# Patient Record
Sex: FEMALE | Race: OTHER | URBAN - METROPOLITAN AREA
[De-identification: names, ages, dates, MRNs, and addresses within clinical notes are randomized per-mention and may not be internally consistent; named-entity substitution may affect disease eponyms.]

---

## 2023-12-01 ENCOUNTER — TELEPHONE (OUTPATIENT)
Dept: OPHTHALMOLOGY | Facility: CLINIC | Age: 48
End: 2023-12-01
Payer: COMMERCIAL

## 2023-12-01 ENCOUNTER — HOSPITAL ENCOUNTER (EMERGENCY)
Facility: HOSPITAL | Age: 48
Discharge: HOME | End: 2023-12-01
Payer: COMMERCIAL

## 2023-12-01 VITALS
RESPIRATION RATE: 16 BRPM | HEART RATE: 83 BPM | HEIGHT: 66 IN | TEMPERATURE: 98 F | SYSTOLIC BLOOD PRESSURE: 117 MMHG | DIASTOLIC BLOOD PRESSURE: 69 MMHG | BODY MASS INDEX: 26.03 KG/M2 | WEIGHT: 162 LBS | OXYGEN SATURATION: 99 %

## 2023-12-01 DIAGNOSIS — H04.123 DRY EYES, BILATERAL: Primary | ICD-10-CM

## 2023-12-01 PROCEDURE — 99284 EMERGENCY DEPT VISIT MOD MDM: CPT

## 2023-12-01 PROCEDURE — 99283 EMERGENCY DEPT VISIT LOW MDM: CPT

## 2023-12-01 PROCEDURE — 99285 EMERGENCY DEPT VISIT HI MDM: CPT | Performed by: NURSE PRACTITIONER

## 2023-12-01 RX ORDER — ERYTHROMYCIN 5 MG/G
1 OINTMENT OPHTHALMIC ONCE
Status: DISCONTINUED | OUTPATIENT
Start: 2023-12-01 | End: 2023-12-01 | Stop reason: HOSPADM

## 2023-12-01 RX ORDER — ERYTHROMYCIN 5 MG/G
OINTMENT OPHTHALMIC NIGHTLY
Qty: 3.5 G | Refills: 0 | Status: SHIPPED | OUTPATIENT
Start: 2023-12-01 | End: 2023-12-11

## 2023-12-01 ASSESSMENT — COLUMBIA-SUICIDE SEVERITY RATING SCALE - C-SSRS
6. HAVE YOU EVER DONE ANYTHING, STARTED TO DO ANYTHING, OR PREPARED TO DO ANYTHING TO END YOUR LIFE?: NO
1. IN THE PAST MONTH, HAVE YOU WISHED YOU WERE DEAD OR WISHED YOU COULD GO TO SLEEP AND NOT WAKE UP?: NO
2. HAVE YOU ACTUALLY HAD ANY THOUGHTS OF KILLING YOURSELF?: NO

## 2023-12-01 NOTE — Clinical Note
Di Dixon was seen and treated in our emergency department on 12/1/2023.  She may return to work on 12/03/2023.       If you have any questions or concerns, please don't hesitate to call.      Iza Rojas PA-C

## 2023-12-01 NOTE — TELEPHONE ENCOUNTER
Pt called triage regarding increase in flashes/floaters. Blurry visual acuity (VA). No triage clinic until 12/11. Recommended pt go to ER to be seen by on call.

## 2023-12-01 NOTE — ED TRIAGE NOTES
Flashes and floaters in the right eye constant x3 weeks. States she called ophthalmology office and they told her to report to the ED. Denies headache. Denies pmhx.

## 2023-12-01 NOTE — ED PROVIDER NOTES
Emergency Department Encounter  [facility] EMERGENCY MEDICINE    Patient: Di Dixon  MRN: [id number]  : 1975  Date of Evaluation: 2023  ED Provider: ELIZABET Rincon      Chief Complaint       Chief Complaint   Patient presents with    Eye Problem     VISION CHANGE        Limitations to History: none  Historian: patient  Records reviewed: EMR inpatient and outpatient notes, Care Everywhere    This is a 48-year-old female who presents to the emergency room with left visual changes.  Patient states that she developed flashes and floaters in her left eye approximately 3 weeks ago.  Denies any recent injury or trauma.  Patient states that she wears reading glasses.  Patient states that she has been having an intermittent frontal headache.  Patient states that she called the on-call ophthalmologist who recommended coming to the emergency room for further evaluation.    PMH: Denies  PSH: Denies  Allergies: Denies  Social HX: + Smoker, denies alcohol or drug use.  Family HX: No family history pertinent to current presenting problem  Medications: Denies    ROS:     Review of Systems  14 systems reviewed and otherwise acutely negative except as in the [place].        Past History     Past Medical History:   Diagnosis Date    Pain in unspecified knee     Knee pain     Past Surgical History:   Procedure Laterality Date    OTHER SURGICAL HISTORY  2020    Cyst excision    OTHER SURGICAL HISTORY  2020    Knee surgery         Medications/Allergies     Previous Medications    No medications on file     No Known Allergies     Physical Exam       ED Triage Vitals [23 1724]   Temp Heart Rate Resp BP   36.7 °C (98 °F) 83 16 117/69      SpO2 Temp Source Heart Rate Source Patient Position   99 % Temporal -- --      BP Location FiO2 (%)     -- --       Physical Exam:    Appearance: Alert, oriented , cooperative,  in no acute distress.     Skin: Intact,  dry skin, no lesions, rash,  "petechiae or purpura.     Eyes: PERRLA, EOMs intact.    ENT: Hearing grossly intact.     Neck: Supple, without meningismus.     Pulmonary: Clear bilaterally with good chest wall excursion. No rales, rhonchi or wheezing. No accessory muscle use or stridor.    Cardiac: Normal S1, S2 without murmur, rub, gallop or extrasystole. No JVD, Carotids without bruits.    Abdomen: Soft, nontender, active bowel sounds.  No palpable organomegaly.  No rebound or guarding.      Musculoskeletal: Full range of motion. no pain, edema, or deformity. Pulses full and equal. No cyanosis, clubbing, or edema.    Neurological:  Normal sensation, no weakness, no focal findings identified.    Psychiatric: Appropriate mood and affect.       Diagnostics   Labs:  No results found for this or any previous visit (from the past 24 hour(s)).   Radiographs:  No orders to display           Assessment   In brief, Di Dixon is a 48 y.o. female who presented to the emergency department with left eye visual changes.          ED Course/MDM   Visit Vitals  /69   Pulse 83   Temp 36.7 °C (98 °F) (Temporal)   Resp 16   Ht 1.676 m (5' 6\")   Wt 73.5 kg (162 lb)   SpO2 99%   BMI 26.15 kg/m²   BSA 1.85 m²       Medications - No data to display    Patient remained stable while in the emergency department. Previous outpatient and ED records were reviewed. Outside records were reviewed.  Ophthalmology was called for consult.  Please see ophthalmology consult for complete details.    Final Impression    No diagnosis found.      DISPOSITION  Disposition: Signed out to Iza Rojas PA-C    Comment: Please note this report has been produced using speech recognition software and may contain errors related to that system including errors in grammar, punctuation, and spelling, as well as words and phrases that may be inappropriate.  If there are any questions or concerns please feel free to contact the dictating provider for clarification.    Geri Jain, " MICHELLE-MICHELLE Lazaro-KEMI  12/01/23 1848

## 2023-12-02 NOTE — PROGRESS NOTES
Emergency Medicine Transition of Care Note.    I received Di Dixon in signout from MOY Jain CNP.  Please see the previous ED provider note for all HPI, PE and MDM up to the time of signout at 1900. This is in addition to the primary record.    In brief Di Dixon is an 48 y.o. female presenting for   Chief Complaint   Patient presents with    Eye Problem     VISION CHANGE     At the time of signout we were awaiting: ophthalmology recommendations.  In brief, this is a 47 yo female who was referred to the emergency department by outpatient ophthalmology referral line.  Patient has been experiencing flashes and floaters to the left eye for the past 3 weeks.    Diagnostics     ED Course as of 12/01/23 2136   Fri Dec 01, 2023   1849 Signed out to Betty Rojas PA-C [HH]      ED Course User Index  [HH] ELIZABET Rincon         Diagnoses as of 12/01/23 2136   Dry eyes, bilateral       Medical Decision Making    Ophthalmology reports that the patient has dry eyes, recommends artificial tears to her bilateral eyes 6 times daily, erythromycin ointment to her bilateral eyes once daily at bedtime.  Ophthalmology will arrange follow-up for the patient.    Final diagnoses:   [H04.123] Dry eyes, bilateral         Iza Rojas PA-C
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
## 2023-12-04 PROBLEM — H33.321 RETINAL HOLE, RIGHT: Status: ACTIVE | Noted: 2023-12-04

## 2023-12-05 ENCOUNTER — OFFICE VISIT (OUTPATIENT)
Dept: OPHTHALMOLOGY | Facility: CLINIC | Age: 48
End: 2023-12-05
Payer: COMMERCIAL

## 2023-12-05 DIAGNOSIS — H33.321 RETINAL HOLE, RIGHT: ICD-10-CM

## 2023-12-05 DIAGNOSIS — H33.323 RETINAL HOLE OF BOTH EYES: Primary | ICD-10-CM

## 2023-12-05 DIAGNOSIS — H04.123 DRY EYES, BILATERAL: ICD-10-CM

## 2023-12-05 PROCEDURE — 92201 OPSCPY EXTND RTA DRAW UNI/BI: CPT

## 2023-12-05 PROCEDURE — 99204 OFFICE O/P NEW MOD 45 MIN: CPT

## 2023-12-05 ASSESSMENT — TONOMETRY
IOP_METHOD: TONOPEN
OD_IOP_MMHG: 15
OS_IOP_MMHG: 16

## 2023-12-05 ASSESSMENT — VISUAL ACUITY
OD_SC: 20/25-3
METHOD: SNELLEN - LINEAR
OS_PH_SC: 20/25
OS_SC: 20/40-2

## 2023-12-05 ASSESSMENT — SLIT LAMP EXAM - LIDS
COMMENTS: NORMAL
COMMENTS: NORMAL

## 2023-12-05 ASSESSMENT — EXTERNAL EXAM - LEFT EYE: OS_EXAM: NORMAL

## 2023-12-05 ASSESSMENT — EXTERNAL EXAM - RIGHT EYE: OD_EXAM: NORMAL

## 2023-12-05 NOTE — PROGRESS NOTES
#Atrophic retinal hole, Both eye   #Lattice Degeneration, Left Eye  - 4 retinal holes noted in the right eye and 3 in the left eye with a cuff of subretinal fluid around each  - Lattice degeneration in the left eye    Plan  -Dicussed r/b/a of treatment options.   - Patient wishes to proceed with laser retinopexy around retinal holes next available  - Follow-up in 1 month     #Dry eye syndrome, both eyes  - Start preservative free artificial tears six times a day, both eyes  - Can consider punctal plugs in clinic if persistent symptoms   - follow-up with Dr. Uriostegui next available

## 2024-01-12 ENCOUNTER — OFFICE VISIT (OUTPATIENT)
Dept: OPHTHALMOLOGY | Facility: CLINIC | Age: 49
End: 2024-01-12
Payer: COMMERCIAL

## 2024-01-12 DIAGNOSIS — H33.321 RETINAL HOLE, RIGHT: Primary | ICD-10-CM

## 2024-01-12 PROCEDURE — 99213 OFFICE O/P EST LOW 20 MIN: CPT

## 2024-01-12 ASSESSMENT — TONOMETRY
OD_IOP_MMHG: 23
OS_IOP_MMHG: 22
IOP_METHOD: GOLDMANN APPLANATION

## 2024-01-12 ASSESSMENT — SLIT LAMP EXAM - LIDS
COMMENTS: NORMAL
COMMENTS: NORMAL

## 2024-01-12 ASSESSMENT — ENCOUNTER SYMPTOMS
CARDIOVASCULAR NEGATIVE: 0
HEMATOLOGIC/LYMPHATIC NEGATIVE: 0
PSYCHIATRIC NEGATIVE: 0
GASTROINTESTINAL NEGATIVE: 0
MUSCULOSKELETAL NEGATIVE: 0
NEUROLOGICAL NEGATIVE: 0
CONSTITUTIONAL NEGATIVE: 0
RESPIRATORY NEGATIVE: 0
ALLERGIC/IMMUNOLOGIC NEGATIVE: 0
EYES NEGATIVE: 1
ENDOCRINE NEGATIVE: 0

## 2024-01-12 ASSESSMENT — CONF VISUAL FIELD
OD_INFERIOR_TEMPORAL_RESTRICTION: 0
OS_INFERIOR_NASAL_RESTRICTION: 0
OD_SUPERIOR_NASAL_RESTRICTION: 0
OD_SUPERIOR_TEMPORAL_RESTRICTION: 0
OS_NORMAL: 1
OS_SUPERIOR_TEMPORAL_RESTRICTION: 0
METHOD: COUNTING FINGERS
OD_NORMAL: 1
OD_INFERIOR_NASAL_RESTRICTION: 0
OS_SUPERIOR_NASAL_RESTRICTION: 0
OS_INFERIOR_TEMPORAL_RESTRICTION: 0

## 2024-01-12 ASSESSMENT — VISUAL ACUITY
OD_SC: 20/20-1
OS_SC: 20/30-2
METHOD: SNELLEN - LINEAR

## 2024-01-12 ASSESSMENT — EXTERNAL EXAM - RIGHT EYE: OD_EXAM: NORMAL

## 2024-01-12 ASSESSMENT — EXTERNAL EXAM - LEFT EYE: OS_EXAM: NORMAL

## 2024-01-12 NOTE — PROGRESS NOTES
#Atrophic retinal hole, Both eye   #Lattice Degeneration, Left Eye  - 4 retinal holes noted in the right eye and 3 in the left eye with a cuff of subretinal fluid around each  - Lattice degeneration in the left eye    OCT 01/12/24     - Right eye (OD): Normal foveal contour, RPE, outer and inner retinal layers. No IRF or SRF    - Left eye (OS): Normal foveal contour, RPE  outer and inner retinal layers. No IRF or SRF    Plan  -Dicussed r/b/a of treatment options.   - Patient wishes to proceed with laser retinopexy around retinal holes next available  - Laser retinopexy was not done due to laser malfunction  - will schedule for laser OU next visit  - Follow-up in 1 month     #Dry eye syndrome, both eyes  - Start preservative free artificial tears six times a day, both eyes  - Can consider punctal plugs in clinic if persistent symptoms   - follow-up with Dr. Uriostegui next available

## 2024-01-15 ENCOUNTER — OFFICE VISIT (OUTPATIENT)
Dept: OPHTHALMOLOGY | Facility: CLINIC | Age: 49
End: 2024-01-15
Payer: COMMERCIAL

## 2024-01-15 DIAGNOSIS — H16.223 KERATOCONJUNCTIVITIS SICCA OF BOTH EYES NOT SPECIFIED AS SJOGREN'S: ICD-10-CM

## 2024-01-15 DIAGNOSIS — H04.123 DRY EYES, BILATERAL: Primary | ICD-10-CM

## 2024-01-15 PROCEDURE — 99213 OFFICE O/P EST LOW 20 MIN: CPT | Performed by: OPHTHALMOLOGY

## 2024-01-15 PROCEDURE — 68761 CLOSE TEAR DUCT OPENING: CPT | Mod: RIGHT SIDE | Performed by: OPHTHALMOLOGY

## 2024-01-15 ASSESSMENT — ENCOUNTER SYMPTOMS
ENDOCRINE NEGATIVE: 0
EYES NEGATIVE: 1
NEUROLOGICAL NEGATIVE: 0
HEMATOLOGIC/LYMPHATIC NEGATIVE: 0
GASTROINTESTINAL NEGATIVE: 0
ALLERGIC/IMMUNOLOGIC NEGATIVE: 0
CONSTITUTIONAL NEGATIVE: 0
MUSCULOSKELETAL NEGATIVE: 0
CARDIOVASCULAR NEGATIVE: 0
RESPIRATORY NEGATIVE: 0
PSYCHIATRIC NEGATIVE: 0

## 2024-01-15 ASSESSMENT — EXTERNAL EXAM - RIGHT EYE: OD_EXAM: NORMAL

## 2024-01-15 ASSESSMENT — CONF VISUAL FIELD
OS_NORMAL: 1
OS_INFERIOR_TEMPORAL_RESTRICTION: 0
METHOD: COUNTING FINGERS
OS_SUPERIOR_NASAL_RESTRICTION: 0
OD_NORMAL: 1
OD_INFERIOR_TEMPORAL_RESTRICTION: 0
OD_INFERIOR_NASAL_RESTRICTION: 0
OS_INFERIOR_NASAL_RESTRICTION: 0
OD_SUPERIOR_NASAL_RESTRICTION: 0
OD_SUPERIOR_TEMPORAL_RESTRICTION: 0
OS_SUPERIOR_TEMPORAL_RESTRICTION: 0

## 2024-01-15 ASSESSMENT — SLIT LAMP EXAM - LIDS
COMMENTS: NORMAL
COMMENTS: NORMAL

## 2024-01-15 ASSESSMENT — VISUAL ACUITY
OD_SC: 20/20-1
OS_SC: 20/25-2
METHOD: SNELLEN - LINEAR

## 2024-01-15 ASSESSMENT — EXTERNAL EXAM - LEFT EYE: OS_EXAM: NORMAL

## 2024-01-15 NOTE — PROGRESS NOTES
Assessment/Plan   Diagnoses and all orders for this visit:  Dry eyes, bilateral  Keratoconjunctivitis sicca of both eyes not specified as Sjogren's  Pt with hx of dry eye OU  Tried restasis for 6-12 months with no improvement  Currently on PFATs 8x/day  Describes both eyes are always burning, foreign body (FB) sensation with occasional blurry vision  Has some sort of arthritis but not sure what it is and she is not taking any treatment for it  No mention of arthritis in her PCP notes from [facility]  Discussed plugs as a way to help improve dryness OU   -     Punctal Occlusion by Plug, Silicone - OD - Right Eye  -     Punctal Occlusion by Plug, Silicone - OS - Left Eye  Also, gave her a sample of tyrvaya to use bid    2-3 months sooner prn

## 2024-01-31 ENCOUNTER — TELEPHONE (OUTPATIENT)
Dept: OPHTHALMOLOGY | Facility: CLINIC | Age: 49
End: 2024-01-31
Payer: COMMERCIAL

## 2024-01-31 DIAGNOSIS — H16.223 KERATOCONJUNCTIVITIS SICCA OF BOTH EYES NOT SPECIFIED AS SJOGREN'S: Primary | ICD-10-CM

## 2024-01-31 RX ORDER — VARENICLINE 0.03 MG/.05ML
1 SPRAY NASAL 2 TIMES DAILY
Qty: 4.2 ML | Refills: 3 | Status: SHIPPED | OUTPATIENT
Start: 2024-01-31 | End: 2024-02-14 | Stop reason: SDUPTHER

## 2024-02-06 NOTE — TELEPHONE ENCOUNTER
Please tell the pt I sent the rx to an online pharmacy ([facility]) that takes care of prior auth and getting the med covered at the best possible price for her.

## 2024-02-09 ENCOUNTER — OFFICE VISIT (OUTPATIENT)
Dept: OPHTHALMOLOGY | Facility: CLINIC | Age: 49
End: 2024-02-09
Payer: COMMERCIAL

## 2024-02-09 DIAGNOSIS — H33.321 RETINAL HOLE, RIGHT: Primary | ICD-10-CM

## 2024-02-09 DIAGNOSIS — H33.323 RETINAL HOLE OF BOTH EYES: ICD-10-CM

## 2024-02-09 PROCEDURE — 67145 PROPH RTA DTCHMNT PC: CPT | Mod: RIGHT SIDE

## 2024-02-09 ASSESSMENT — SLIT LAMP EXAM - LIDS
COMMENTS: NORMAL
COMMENTS: NORMAL

## 2024-02-09 ASSESSMENT — ENCOUNTER SYMPTOMS
ENDOCRINE NEGATIVE: 0
PSYCHIATRIC NEGATIVE: 0
RESPIRATORY NEGATIVE: 0
CARDIOVASCULAR NEGATIVE: 0
GASTROINTESTINAL NEGATIVE: 0
NEUROLOGICAL NEGATIVE: 0
HEMATOLOGIC/LYMPHATIC NEGATIVE: 0
CONSTITUTIONAL NEGATIVE: 0
ALLERGIC/IMMUNOLOGIC NEGATIVE: 0
MUSCULOSKELETAL NEGATIVE: 0
EYES NEGATIVE: 1

## 2024-02-09 ASSESSMENT — VISUAL ACUITY
OS_CC: 20/20-
METHOD: SNELLEN - LINEAR
OD_CC: 20/20

## 2024-02-09 ASSESSMENT — EXTERNAL EXAM - RIGHT EYE: OD_EXAM: NORMAL

## 2024-02-09 ASSESSMENT — EXTERNAL EXAM - LEFT EYE: OS_EXAM: NORMAL

## 2024-02-09 NOTE — PROGRESS NOTES
#Atrophic retinal hole, Both eye   #Lattice Degeneration, Left Eye    Retinal holes, lattice, right eye  - 2 small holes with lattice at 12  - SN small hole and lattice  - 1 hole with SRF and small HST at the ora at the temporal retina  - 1 hole with surrounding RPE hyperplasia IT    Retinal holes, lattice, left eye  - 3 holes in the left eye with a cuff of subretinal fluid around each  - Lattice degeneration in the left eye    OCT 02/08/24     - Right eye (OD): Normal foveal contour, RPE, outer and inner retinal layers. No IRF or SRF    - Left eye (OS): Normal foveal contour, RPE  outer and inner retinal layers. No IRF or SRF    Plan  -Dicussed r/b/a of treatment options.   - Patient wishes to proceed with laser retinopexy around retinal holes next available  - Laser retinopexy was done OD  - Will need additional laser OD to the HST at the ora in the temporal retina OD -  - will schedule for laser OS next visit  - Follow-up in 1 month     #Dry eye syndrome, both eyes  - Start preservative free artificial tears six times a day, both eyes  - Can consider punctal plugs in clinic if persistent symptoms   - follow-up with Dr. Uriostegui next available

## 2024-02-14 DIAGNOSIS — H16.223 KERATOCONJUNCTIVITIS SICCA OF BOTH EYES NOT SPECIFIED AS SJOGREN'S: ICD-10-CM

## 2024-02-14 RX ORDER — VARENICLINE 0.03 MG/.05ML
1 SPRAY NASAL 2 TIMES DAILY
Qty: 4.2 ML | Refills: 3 | Status: SHIPPED | OUTPATIENT
Start: 2024-02-14 | End: 2024-02-16 | Stop reason: SDUPTHER

## 2024-02-16 DIAGNOSIS — H16.223 KERATOCONJUNCTIVITIS SICCA OF BOTH EYES NOT SPECIFIED AS SJOGREN'S: ICD-10-CM

## 2024-02-16 RX ORDER — VARENICLINE 0.03 MG/.05ML
1 SPRAY NASAL 2 TIMES DAILY
Qty: 4.2 ML | Refills: 3 | Status: SHIPPED | OUTPATIENT
Start: 2024-02-16 | End: 2024-03-21 | Stop reason: SDUPTHER

## 2024-03-08 ENCOUNTER — OFFICE VISIT (OUTPATIENT)
Dept: OPHTHALMOLOGY | Facility: CLINIC | Age: 49
End: 2024-03-08
Payer: COMMERCIAL

## 2024-03-08 DIAGNOSIS — H33.322 PERIPHERAL RETINAL HOLE, LEFT: ICD-10-CM

## 2024-03-08 DIAGNOSIS — H33.321 RETINAL HOLE, RIGHT: Primary | ICD-10-CM

## 2024-03-08 PROCEDURE — 67145 PROPH RTA DTCHMNT PC: CPT | Mod: LEFT SIDE

## 2024-03-08 ASSESSMENT — SLIT LAMP EXAM - LIDS
COMMENTS: NORMAL
COMMENTS: NORMAL

## 2024-03-08 ASSESSMENT — CONF VISUAL FIELD
OD_INFERIOR_NASAL_RESTRICTION: 0
OS_INFERIOR_NASAL_RESTRICTION: 0
OS_SUPERIOR_TEMPORAL_RESTRICTION: 0
OS_INFERIOR_TEMPORAL_RESTRICTION: 0
OS_SUPERIOR_NASAL_RESTRICTION: 0
OD_INFERIOR_TEMPORAL_RESTRICTION: 0
OD_SUPERIOR_TEMPORAL_RESTRICTION: 0
OD_SUPERIOR_NASAL_RESTRICTION: 0
OD_NORMAL: 1
OS_NORMAL: 1

## 2024-03-08 ASSESSMENT — EXTERNAL EXAM - LEFT EYE: OS_EXAM: NORMAL

## 2024-03-08 ASSESSMENT — VISUAL ACUITY
OD_SC: 20/20
METHOD: SNELLEN - LINEAR
OS_PH_SC: 20/20-2
OS_SC: 20/30

## 2024-03-08 ASSESSMENT — TONOMETRY
OS_IOP_MMHG: 16
OD_IOP_MMHG: 16
IOP_METHOD: GOLDMANN APPLANATION

## 2024-03-08 ASSESSMENT — EXTERNAL EXAM - RIGHT EYE: OD_EXAM: NORMAL

## 2024-03-08 NOTE — PROGRESS NOTES
#Atrophic retinal hole, Both eye   #Lattice Degeneration, Left Eye    Retinal holes, lattice, right eye  - 2 small holes with lattice at 12  - SN small hole and lattice  - 1 hole with SRF and small HST at the ora at the temporal retina  - 1 hole with surrounding RPE hyperplasia IT    S/P Laser retinopexy  Stable  Observe    Retinal holes, lattice, left eye  - 3 holes in the left eye with a cuff of subretinal fluid around each  - Lattice degeneration in the left eye    OCT 03/08/24     - Right eye (OD): Normal foveal contour, RPE, outer and inner retinal layers. No IRF or SRF    - Left eye (OS): Normal foveal contour, RPE  outer and inner retinal layers. No IRF or SRF    Plan  -Dicussed r/b/a of treatment options.   - Patient wishes to proceed with laser retinopexy around retinal holes next available  - Laser retinopexy OS done  - Follow-up in 1 month     #Dry eye syndrome, both eyes  - Start preservative free artificial tears six times a day, both eyes  - Can consider punctal plugs in clinic if persistent symptoms   - follow-up with Dr. Uriostegui next available

## 2024-03-21 ENCOUNTER — OFFICE VISIT (OUTPATIENT)
Dept: OPHTHALMOLOGY | Facility: CLINIC | Age: 49
End: 2024-03-21
Payer: COMMERCIAL

## 2024-03-21 DIAGNOSIS — H04.123 DRY EYES, BILATERAL: ICD-10-CM

## 2024-03-21 DIAGNOSIS — H10.13 ALLERGIC CONJUNCTIVITIS OF BOTH EYES: ICD-10-CM

## 2024-03-21 DIAGNOSIS — H16.223 KERATOCONJUNCTIVITIS SICCA OF BOTH EYES NOT SPECIFIED AS SJOGREN'S: Primary | ICD-10-CM

## 2024-03-21 PROCEDURE — 99213 OFFICE O/P EST LOW 20 MIN: CPT | Performed by: OPHTHALMOLOGY

## 2024-03-21 RX ORDER — VARENICLINE 0.03 MG/.05ML
1 SPRAY NASAL 2 TIMES DAILY
Qty: 4.2 ML | Refills: 3 | Status: SHIPPED | OUTPATIENT
Start: 2024-03-21 | End: 2025-03-21

## 2024-03-21 RX ORDER — OLOPATADINE HYDROCHLORIDE 1 MG/ML
1 SOLUTION/ DROPS OPHTHALMIC 2 TIMES DAILY
Qty: 5 ML | Refills: 2 | Status: SHIPPED | OUTPATIENT
Start: 2024-03-21 | End: 2025-03-21

## 2024-03-21 RX ORDER — METHOCARBAMOL 750 MG/1
750 TABLET, FILM COATED ORAL EVERY 8 HOURS PRN
COMMUNITY
Start: 2023-12-07

## 2024-03-21 RX ORDER — CYCLOBENZAPRINE HCL 10 MG
TABLET ORAL
COMMUNITY
Start: 2024-02-28

## 2024-03-21 ASSESSMENT — EXTERNAL EXAM - RIGHT EYE: OD_EXAM: NORMAL

## 2024-03-21 ASSESSMENT — EXTERNAL EXAM - LEFT EYE: OS_EXAM: NORMAL

## 2024-03-21 ASSESSMENT — CONF VISUAL FIELD
OD_NORMAL: 1
OS_INFERIOR_TEMPORAL_RESTRICTION: 0
OS_NORMAL: 1
METHOD: COUNTING FINGERS
OS_SUPERIOR_NASAL_RESTRICTION: 0
OS_INFERIOR_NASAL_RESTRICTION: 0
OD_INFERIOR_TEMPORAL_RESTRICTION: 0
OD_SUPERIOR_TEMPORAL_RESTRICTION: 0
OD_INFERIOR_NASAL_RESTRICTION: 0
OD_SUPERIOR_NASAL_RESTRICTION: 0
OS_SUPERIOR_TEMPORAL_RESTRICTION: 0

## 2024-03-21 ASSESSMENT — TONOMETRY
IOP_METHOD: TONOPEN
OS_IOP_MMHG: HOLD
OD_IOP_MMHG: HOLD

## 2024-03-21 ASSESSMENT — VISUAL ACUITY
OS_SC: 20/30
OD_SC: 20/25
METHOD: SNELLEN - LINEAR

## 2024-03-21 ASSESSMENT — SLIT LAMP EXAM - LIDS
COMMENTS: NORMAL
COMMENTS: NORMAL

## 2024-03-21 ASSESSMENT — ENCOUNTER SYMPTOMS: EYES NEGATIVE: 1

## 2024-08-10 DIAGNOSIS — H16.223 KERATOCONJUNCTIVITIS SICCA OF BOTH EYES NOT SPECIFIED AS SJOGREN'S: ICD-10-CM

## 2024-08-12 RX ORDER — VARENICLINE 0.03 MG/.05ML
SPRAY NASAL
Qty: 8.4 ML | Refills: 3 | Status: SHIPPED | OUTPATIENT
Start: 2024-08-12

## 2024-08-26 DIAGNOSIS — H10.13 ALLERGIC CONJUNCTIVITIS OF BOTH EYES: ICD-10-CM

## 2024-08-26 RX ORDER — OLOPATADINE HYDROCHLORIDE 1 MG/ML
1 SOLUTION/ DROPS OPHTHALMIC 2 TIMES DAILY
Qty: 5 ML | Refills: 2 | Status: SHIPPED | OUTPATIENT
Start: 2024-08-26 | End: 2024-08-30 | Stop reason: SDUPTHER

## 2024-08-30 DIAGNOSIS — H10.13 ALLERGIC CONJUNCTIVITIS OF BOTH EYES: ICD-10-CM

## 2024-08-30 RX ORDER — OLOPATADINE HYDROCHLORIDE 1 MG/ML
1 SOLUTION/ DROPS OPHTHALMIC 2 TIMES DAILY
Qty: 15 ML | Refills: 3 | Status: SHIPPED | OUTPATIENT
Start: 2024-08-30 | End: 2025-08-30

## 2024-08-30 NOTE — TELEPHONE ENCOUNTER
[facility] pharmacy faxed over request for Olopatadine refill. MONICO bishop/ Dr. Uriostegui on 03/21/2024.

## 2024-09-27 ENCOUNTER — OFFICE VISIT (OUTPATIENT)
Dept: PRIMARY CARE | Facility: CLINIC | Age: 49
End: 2024-09-27
Payer: COMMERCIAL

## 2024-09-27 ENCOUNTER — HOSPITAL ENCOUNTER (OUTPATIENT)
Dept: RADIOLOGY | Facility: CLINIC | Age: 49
Discharge: HOME | End: 2024-09-27
Payer: COMMERCIAL

## 2024-09-27 ENCOUNTER — LAB (OUTPATIENT)
Dept: LAB | Facility: LAB | Age: 49
End: 2024-09-27
Payer: COMMERCIAL

## 2024-09-27 VITALS
HEIGHT: 66 IN | DIASTOLIC BLOOD PRESSURE: 66 MMHG | TEMPERATURE: 97.8 F | BODY MASS INDEX: 26.68 KG/M2 | RESPIRATION RATE: 16 BRPM | HEART RATE: 88 BPM | OXYGEN SATURATION: 99 % | SYSTOLIC BLOOD PRESSURE: 102 MMHG | WEIGHT: 166 LBS

## 2024-09-27 DIAGNOSIS — M54.10 ACUTE BACK PAIN WITH RADICULOPATHY: Primary | ICD-10-CM

## 2024-09-27 DIAGNOSIS — M54.10 ACUTE BACK PAIN WITH RADICULOPATHY: ICD-10-CM

## 2024-09-27 DIAGNOSIS — R10.2 PELVIC PAIN: ICD-10-CM

## 2024-09-27 DIAGNOSIS — M54.32 BILATERAL SCIATICA: ICD-10-CM

## 2024-09-27 DIAGNOSIS — M54.31 BILATERAL SCIATICA: ICD-10-CM

## 2024-09-27 PROBLEM — E78.00 HYPERCHOLESTEROLEMIA: Status: ACTIVE | Noted: 2022-10-14

## 2024-09-27 PROBLEM — R53.83 OTHER FATIGUE: Status: ACTIVE | Noted: 2022-10-14

## 2024-09-27 PROBLEM — N95.1 SYMPTOMATIC MENOPAUSAL OR FEMALE CLIMACTERIC STATES: Status: ACTIVE | Noted: 2021-08-09

## 2024-09-27 PROBLEM — J34.2 NASAL SEPTAL DEVIATION: Status: ACTIVE | Noted: 2024-09-27

## 2024-09-27 PROBLEM — E55.9 VITAMIN D DEFICIENCY: Status: ACTIVE | Noted: 2021-08-01

## 2024-09-27 PROBLEM — M19.90 OSTEOARTHRITIS: Status: ACTIVE | Noted: 2018-02-09

## 2024-09-27 PROBLEM — R43.0 ANOSMIA: Status: ACTIVE | Noted: 2024-09-27

## 2024-09-27 PROBLEM — J32.8 OTHER CHRONIC SINUSITIS: Status: ACTIVE | Noted: 2024-09-27

## 2024-09-27 PROBLEM — R00.2 PALPITATION: Status: ACTIVE | Noted: 2022-10-14

## 2024-09-27 PROBLEM — M35.01 KERATOCONJUNCTIVITIS SICCA OF BOTH EYES: Status: ACTIVE | Noted: 2023-12-01

## 2024-09-27 PROBLEM — J34.89 NASAL SEPTAL PERFORATION: Status: ACTIVE | Noted: 2024-09-27

## 2024-09-27 LAB
APPEARANCE UR: CLEAR
BASOPHILS # BLD AUTO: 0.06 X10*3/UL (ref 0–0.1)
BASOPHILS NFR BLD AUTO: 0.7 %
BILIRUB UR STRIP.AUTO-MCNC: NEGATIVE MG/DL
COLOR UR: NORMAL
EOSINOPHIL # BLD AUTO: 0.13 X10*3/UL (ref 0–0.7)
EOSINOPHIL NFR BLD AUTO: 1.5 %
ERYTHROCYTE [DISTWIDTH] IN BLOOD BY AUTOMATED COUNT: 13.9 % (ref 11.5–14.5)
ERYTHROCYTE [SEDIMENTATION RATE] IN BLOOD BY WESTERGREN METHOD: 15 MM/H (ref 0–20)
GLUCOSE UR STRIP.AUTO-MCNC: NORMAL MG/DL
HCT VFR BLD AUTO: 42.8 % (ref 36–46)
HGB BLD-MCNC: 14.3 G/DL (ref 12–16)
IMM GRANULOCYTES # BLD AUTO: 0.04 X10*3/UL (ref 0–0.7)
IMM GRANULOCYTES NFR BLD AUTO: 0.5 % (ref 0–0.9)
KETONES UR STRIP.AUTO-MCNC: NEGATIVE MG/DL
LEUKOCYTE ESTERASE UR QL STRIP.AUTO: NEGATIVE
LYMPHOCYTES # BLD AUTO: 2.18 X10*3/UL (ref 1.2–4.8)
LYMPHOCYTES NFR BLD AUTO: 24.6 %
MCH RBC QN AUTO: 29.2 PG (ref 26–34)
MCHC RBC AUTO-ENTMCNC: 33.4 G/DL (ref 32–36)
MCV RBC AUTO: 87 FL (ref 80–100)
MONOCYTES # BLD AUTO: 0.61 X10*3/UL (ref 0.1–1)
MONOCYTES NFR BLD AUTO: 6.9 %
NEUTROPHILS # BLD AUTO: 5.85 X10*3/UL (ref 1.2–7.7)
NEUTROPHILS NFR BLD AUTO: 65.8 %
NITRITE UR QL STRIP.AUTO: NEGATIVE
NRBC BLD-RTO: 0 /100 WBCS (ref 0–0)
PH UR STRIP.AUTO: 6.5 [PH]
PLATELET # BLD AUTO: 237 X10*3/UL (ref 150–450)
PREGNANCY TEST URINE, POC: NEGATIVE
PROT UR STRIP.AUTO-MCNC: NEGATIVE MG/DL
RBC # BLD AUTO: 4.9 X10*6/UL (ref 4–5.2)
RBC # UR STRIP.AUTO: NEGATIVE /UL
SP GR UR STRIP.AUTO: 1.01
UROBILINOGEN UR STRIP.AUTO-MCNC: NORMAL MG/DL
WBC # BLD AUTO: 8.9 X10*3/UL (ref 4.4–11.3)

## 2024-09-27 PROCEDURE — 72070 X-RAY EXAM THORAC SPINE 2VWS: CPT

## 2024-09-27 PROCEDURE — 85025 COMPLETE CBC W/AUTO DIFF WBC: CPT

## 2024-09-27 PROCEDURE — 72100 X-RAY EXAM L-S SPINE 2/3 VWS: CPT

## 2024-09-27 PROCEDURE — 81003 URINALYSIS AUTO W/O SCOPE: CPT

## 2024-09-27 PROCEDURE — 87086 URINE CULTURE/COLONY COUNT: CPT

## 2024-09-27 PROCEDURE — 36415 COLL VENOUS BLD VENIPUNCTURE: CPT

## 2024-09-27 PROCEDURE — 85652 RBC SED RATE AUTOMATED: CPT

## 2024-09-27 PROCEDURE — 72040 X-RAY EXAM NECK SPINE 2-3 VW: CPT

## 2024-09-27 RX ORDER — TIZANIDINE 4 MG/1
4 TABLET ORAL EVERY 6 HOURS PRN
Qty: 30 TABLET | Refills: 0 | Status: SHIPPED | OUTPATIENT
Start: 2024-09-27 | End: 2024-10-07

## 2024-09-27 RX ORDER — DICLOFENAC SODIUM 10 MG/G
4 GEL TOPICAL 4 TIMES DAILY
Qty: 100 G | Refills: 1 | Status: SHIPPED | OUTPATIENT
Start: 2024-09-27

## 2024-09-27 RX ORDER — PREDNISONE 50 MG/1
50 TABLET ORAL DAILY
Qty: 7 TABLET | Refills: 0 | Status: SHIPPED | OUTPATIENT
Start: 2024-09-27 | End: 2024-10-04

## 2024-09-27 RX ORDER — LIDOCAINE 50 MG/G
2 PATCH TOPICAL DAILY
Qty: 30 PATCH | Refills: 0 | Status: SHIPPED | OUTPATIENT
Start: 2024-09-27 | End: 2024-10-07

## 2024-09-27 ASSESSMENT — ENCOUNTER SYMPTOMS
LEG PAIN: 1
DYSURIA: 0
NUMBNESS: 0
BOWEL INCONTINENCE: 0
WEAKNESS: 0
HEADACHES: 0
TINGLING: 1
PARESTHESIAS: 1
BACK PAIN: 1
FEVER: 0
PARESIS: 0
PERIANAL NUMBNESS: 0
ABDOMINAL PAIN: 1
WEIGHT LOSS: 0

## 2024-09-27 NOTE — PROGRESS NOTES
Subjective     Di Dixon is a 49 y.o. female who presents for Back Pain.    Di Dixon is a 49-year-old female with past medical history significant for osteoarthritis, chronic back pain who presents for acute back pain that began yesterday.  Symptoms have been present for 2 days and include pain in the low back and right gluteal muscles, wrapping around to the right groin and down both the left and right leg (aching, sharp, shooting, and tingling in character; 9/10 in severity) and stiffness in entire spine. Initial inciting event: Patient does not believe there was an inciting event yesterday. Symptoms are the same at all times of the day.  Alleviating factors identifiable by the patient are bending backwards, standing, and walking. Aggravating factors identifiable by the patient are bending forwards, bending sideways, recumbency, running, and sitting. Treatments initiated by the patient: Patient states that she took one of her children's oxycodones that did not improve any of the pain additionally she has been using a heating pad, Motrin, Tylenol with little to no relief. Previous lower back problems: Has previously had acute muscle spasms in the entirety of her spine with her last flare about 3 months ago. Previous work up: none.  Has not had imaging of her spine as far she is aware and there is none located under her chart.  Previous treatments: She has been given small doses of Oxy, muscle relaxants, anti-inflammatories with some relief.    Patient thinks she is perimenopausal as she has had abnormal periods for the last several months with extended time in between periods.  Her last period ended 4 days ago.  She denies any abnormal vaginal discharge, vaginal pain, or any other pelvic pain.    Back Pain  This is a recurrent problem. The current episode started yesterday. The problem occurs constantly. The problem has been gradually worsening since onset. The pain is present in the gluteal, lumbar spine and  "sacro-iliac. The quality of the pain is described as aching and shooting. The pain radiates to the right thigh. The pain is at a severity of 9/10. The pain is moderate. The pain is The same all the time. The symptoms are aggravated by bending, position, sitting, standing and twisting. Stiffness is present All day. Associated symptoms include abdominal pain (Patient states that when palpated in the abdomen there is pain that she feels on her back), leg pain, paresthesias and tingling. Pertinent negatives include no bladder incontinence, bowel incontinence, chest pain, dysuria, fever, headaches, numbness, paresis, pelvic pain, perianal numbness, weakness or weight loss. Risk factors include sedentary lifestyle, lack of exercise and menopause. She has tried bed rest, analgesics, heat, walking and NSAIDs for the symptoms. The treatment provided no relief.        Review of Systems   Constitutional:  Negative for fever and weight loss.   Cardiovascular:  Negative for chest pain.   Gastrointestinal:  Positive for abdominal pain (Patient states that when palpated in the abdomen there is pain that she feels on her back). Negative for bowel incontinence.   Genitourinary:  Negative for bladder incontinence, dysuria and pelvic pain.   Musculoskeletal:  Positive for back pain.   Neurological:  Positive for tingling and paresthesias. Negative for weakness, numbness and headaches.       Objective     Vitals:    09/27/24 1103   BP: 102/66   BP Location: Left arm   Patient Position: Sitting   BP Cuff Size: Adult   Pulse: 88   Resp: 16   Temp: 36.6 °C (97.8 °F)   SpO2: 99%   Weight: 75.3 kg (166 lb)   Height: 1.676 m (5' 6\")        Current Outpatient Medications   Medication Instructions    diclofenac sodium (VOLTAREN) 4 g, Topical, 4 times daily, Apply to low back.    lidocaine (Lidoderm) 5 % patch 2 patches, transdermal, Daily, Apply to painful area 12 hours per day, remove for 12 hours.    predniSONE (DELTASONE) 50 mg, oral, Daily "    tiZANidine (ZANAFLEX) 4 mg, oral, Every 6 hours PRN    varenicline (Tyrvaya) 0.03 mg/spray USE 1 SPRAY IN EACH NOSTRIL TWICE DAILY, APPROXIMATELY 12 HOURS APART        No Known Allergies     Past Surgical History:   Procedure Laterality Date    OTHER SURGICAL HISTORY  02/12/2020    Cyst excision    OTHER SURGICAL HISTORY  02/12/2020    Knee surgery    OTHER SURGICAL HISTORY Bilateral 01/15/2024    Punctal Plugs, RLL, LLL        Social History     Tobacco Use    Smoking status: Every Day     Types: Cigarettes    Smokeless tobacco: Never        Social History     Substance and Sexual Activity   Alcohol Use None       No family history on file.     Immunization History   Administered Date(s) Administered    Pfizer Purple Cap SARS-CoV-2 04/14/2021, 05/05/2021        Physical Exam  Vitals and nursing note reviewed.   Constitutional:       General: She is in acute distress.      Appearance: Normal appearance. She is normal weight. She is not ill-appearing or diaphoretic.   HENT:      Head: Normocephalic and atraumatic.      Mouth/Throat:      Mouth: Mucous membranes are moist.   Eyes:      Conjunctiva/sclera: Conjunctivae normal.   Cardiovascular:      Rate and Rhythm: Normal rate and regular rhythm.      Pulses: Normal pulses.   Pulmonary:      Effort: Pulmonary effort is normal.      Breath sounds: Normal breath sounds.   Abdominal:      General: Abdomen is flat. Bowel sounds are normal. There is no distension.      Palpations: Abdomen is soft. There is no mass.      Tenderness: There is no right CVA tenderness, left CVA tenderness, guarding or rebound.      Hernia: No hernia is present.   Musculoskeletal:         General: Tenderness present.      Cervical back: Normal, normal range of motion and neck supple.      Thoracic back: Spasms and tenderness present. No bony tenderness. Decreased range of motion.      Lumbar back: Spasms, tenderness and bony tenderness present. Decreased range of motion. Positive right  straight leg raise test and positive left straight leg raise test.      Right lower leg: No edema.      Left lower leg: No edema.   Skin:     General: Skin is warm and dry.      Capillary Refill: Capillary refill takes less than 2 seconds.   Neurological:      General: No focal deficit present.      Mental Status: She is alert and oriented to person, place, and time. Mental status is at baseline.   Psychiatric:         Mood and Affect: Mood normal.         Behavior: Behavior normal.         Thought Content: Thought content normal.         Problem List Items Addressed This Visit    None  Visit Diagnoses       Acute back pain with radiculopathy    -  Primary    Relevant Medications    predniSONE (Deltasone) 50 mg tablet    tiZANidine (Zanaflex) 4 mg tablet    lidocaine (Lidoderm) 5 % patch    diclofenac sodium (Voltaren) 1 % gel    Other Relevant Orders    XR cervical spine 2-3 views    XR thoracic spine 2 views    XR lumbar spine 2-3 views    POCT pregnancy, urine manually resulted (Completed)    CBC and Auto Differential    Sedimentation Rate    Urinalysis with Reflex Microscopic    US pelvis    Urine Culture    Bilateral sciatica        Relevant Medications    tiZANidine (Zanaflex) 4 mg tablet    lidocaine (Lidoderm) 5 % patch    Other Relevant Orders    XR cervical spine 2-3 views    XR thoracic spine 2 views    XR lumbar spine 2-3 views    Pelvic pain        Relevant Orders    US pelvis    Urine Culture            Assessment & Plan  Acute back pain with radiculopathy  Patient is presenting today with 2 days of acute low back pain, right gluteal pain, bilateral sciatica.  Patient is notably uncomfortable in exam room today and does have positive straight leg testing as well as difficulty with flexion, extension, switching positions from lying down, sitting, standing up.  She is tender to palpation in the lower thoracic and upper lumbar spine, right piriformis muscle, bilateral ASIS.  Patient has not had imaging of  her spine previously.  We will order cervical, thoracic, lumbar spine x-rays to evaluate for any vertebral abnormalities that may be causing radiculopathy and pain including spondylosis, spondylolisthesis, fracture, stenosis, bulging of disks, etc.  Additionally will encourage patient to use supportive care with heat, lidocaine patch, topical Voltaren, prednisone burst.  After prednisone burst, patient may start anti-inflammatories with ibuprofen.  ED and return precautions given to patient.  Questions answered and patient expressed understanding.  Bilateral sciatica    Pelvic pain  Patient is tender to palpation bilaterally near her ASIS which she states will make pain radiate to her back bilaterally.  Because she is tender to palpation in these areas, will order abdominal ultrasound of the pelvis, urine pregnancy test, UA and urine culture to rule out possible pelvic inflammatory disease, ectopic pregnancy, UTI, or other gynecological emergency, the suspicion for these is overall low.     The patient was seen and discussed with attending physician, Dr. Mijares.       Lyla Farmer DO  Family Medicine Resident, PGY-2  [facility]  [address] [place] [address] [phone]     This note has been transcribed using Dragon voice recognition system and there is a possibility of unintentional typing misprints.  Any information found to be copied from previous providers is done in the best interest of the patient to provide accurate, quality, and continuity of care.

## 2024-09-29 LAB — BACTERIA UR CULT: NORMAL

## 2024-10-02 ENCOUNTER — HOSPITAL ENCOUNTER (OUTPATIENT)
Dept: RADIOLOGY | Facility: HOSPITAL | Age: 49
Discharge: HOME | End: 2024-10-02
Payer: COMMERCIAL

## 2024-10-02 DIAGNOSIS — M54.10 ACUTE BACK PAIN WITH RADICULOPATHY: ICD-10-CM

## 2024-10-02 DIAGNOSIS — R10.2 PELVIC PAIN: ICD-10-CM

## 2024-10-02 PROCEDURE — 76856 US EXAM PELVIC COMPLETE: CPT

## 2024-10-02 PROCEDURE — 76830 TRANSVAGINAL US NON-OB: CPT | Performed by: RADIOLOGY

## 2024-10-02 PROCEDURE — 76856 US EXAM PELVIC COMPLETE: CPT | Performed by: RADIOLOGY

## 2024-12-16 DIAGNOSIS — H04.123 DRY EYES, BILATERAL: Primary | ICD-10-CM

## 2024-12-16 RX ORDER — POLYETHYLENE GLYCOL 400 AND PROPYLENE GLYCOL 4; 3 MG/ML; MG/ML
1 SOLUTION/ DROPS OPHTHALMIC 4 TIMES DAILY
Qty: 60 EACH | Refills: 6 | Status: SHIPPED | OUTPATIENT
Start: 2024-12-16 | End: 2025-03-16

## 2024-12-31 ENCOUNTER — APPOINTMENT (OUTPATIENT)
Dept: PRIMARY CARE | Facility: CLINIC | Age: 49
End: 2024-12-31
Payer: COMMERCIAL

## 2024-12-31 VITALS
DIASTOLIC BLOOD PRESSURE: 66 MMHG | HEART RATE: 79 BPM | SYSTOLIC BLOOD PRESSURE: 99 MMHG | BODY MASS INDEX: 26.51 KG/M2 | RESPIRATION RATE: 16 BRPM | WEIGHT: 164.25 LBS | OXYGEN SATURATION: 98 % | TEMPERATURE: 97.6 F

## 2024-12-31 DIAGNOSIS — M62.81 MUSCLE WEAKNESS (GENERALIZED): ICD-10-CM

## 2024-12-31 DIAGNOSIS — H16.223 KERATOCONJUNCTIVITIS SICCA OF BOTH EYES: Primary | ICD-10-CM

## 2024-12-31 DIAGNOSIS — R53.83 OTHER FATIGUE: ICD-10-CM

## 2024-12-31 DIAGNOSIS — Q80.9 XERODERMA: ICD-10-CM

## 2024-12-31 PROCEDURE — 99214 OFFICE O/P EST MOD 30 MIN: CPT

## 2024-12-31 NOTE — ASSESSMENT & PLAN NOTE
Patient's history of dry eyes, dry mouth, dry skin, fatigue, generalized pain is suggestive of possible underlying autoimmune condition/systemic disease.  Previous workup shows negative DEREK and negative RF from 2020.  No additional testing has been done.  There is suspicion for systemic process and therefore will evaluate with autoimmune panel as ordered.  Additionally will refer patient to rheumatology for further evaluation and possible management of unknown condition.

## 2025-01-03 NOTE — PROGRESS NOTES
I reviewed with the resident the medical history and the resident’s findings on physical examination.  I discussed with the resident the patient’s diagnosis and concur with the treatment plan as documented in the resident note.     Genaro Bernstein, DO

## 2025-01-09 ENCOUNTER — LAB (OUTPATIENT)
Dept: LAB | Facility: LAB | Age: 50
End: 2025-01-09
Payer: COMMERCIAL

## 2025-01-09 DIAGNOSIS — M62.81 MUSCLE WEAKNESS (GENERALIZED): ICD-10-CM

## 2025-01-09 DIAGNOSIS — H16.223 KERATOCONJUNCTIVITIS SICCA OF BOTH EYES: ICD-10-CM

## 2025-01-09 DIAGNOSIS — Q80.9 XERODERMA: ICD-10-CM

## 2025-01-09 LAB
CRP SERPL-MCNC: 0.14 MG/DL
ERYTHROCYTE [SEDIMENTATION RATE] IN BLOOD BY WESTERGREN METHOD: 9 MM/H (ref 0–20)
URATE SERPL-MCNC: 3.9 MG/DL (ref 2.3–6.7)

## 2025-01-09 PROCEDURE — 85652 RBC SED RATE AUTOMATED: CPT

## 2025-01-09 PROCEDURE — 86140 C-REACTIVE PROTEIN: CPT

## 2025-01-09 PROCEDURE — 86431 RHEUMATOID FACTOR QUANT: CPT

## 2025-01-09 PROCEDURE — 86038 ANTINUCLEAR ANTIBODIES: CPT

## 2025-01-09 PROCEDURE — 84550 ASSAY OF BLOOD/URIC ACID: CPT

## 2025-01-09 PROCEDURE — 82306 VITAMIN D 25 HYDROXY: CPT

## 2025-01-10 LAB
25(OH)D3 SERPL-MCNC: 56 NG/ML (ref 30–100)
ANA SER QL HEP2 SUBST: NEGATIVE
RHEUMATOID FACT SER NEPH-ACNC: <10 IU/ML (ref 0–15)

## 2025-02-10 DIAGNOSIS — H16.223 KERATOCONJUNCTIVITIS SICCA OF BOTH EYES NOT SPECIFIED AS SJOGREN'S: ICD-10-CM

## 2025-02-10 RX ORDER — VARENICLINE 0.03 MG/.05ML
SPRAY NASAL
Qty: 8.4 ML | Refills: 3 | Status: SHIPPED | OUTPATIENT
Start: 2025-02-10

## 2025-02-17 ENCOUNTER — APPOINTMENT (OUTPATIENT)
Facility: CLINIC | Age: 50
End: 2025-02-17
Payer: COMMERCIAL

## 2025-02-17 VITALS
WEIGHT: 169 LBS | HEART RATE: 87 BPM | BODY MASS INDEX: 27.16 KG/M2 | DIASTOLIC BLOOD PRESSURE: 67 MMHG | HEIGHT: 66 IN | TEMPERATURE: 97.7 F | SYSTOLIC BLOOD PRESSURE: 102 MMHG

## 2025-02-17 DIAGNOSIS — M62.81 MUSCLE WEAKNESS (GENERALIZED): ICD-10-CM

## 2025-02-17 DIAGNOSIS — H16.223 KERATOCONJUNCTIVITIS SICCA OF BOTH EYES: ICD-10-CM

## 2025-02-17 DIAGNOSIS — R53.83 OTHER FATIGUE: ICD-10-CM

## 2025-02-17 DIAGNOSIS — M15.9 POLYARTICULAR OSTEOARTHRITIS: Primary | ICD-10-CM

## 2025-02-17 DIAGNOSIS — Q80.9 XERODERMA: ICD-10-CM

## 2025-02-17 PROCEDURE — 3008F BODY MASS INDEX DOCD: CPT | Performed by: STUDENT IN AN ORGANIZED HEALTH CARE EDUCATION/TRAINING PROGRAM

## 2025-02-17 PROCEDURE — 99204 OFFICE O/P NEW MOD 45 MIN: CPT | Performed by: STUDENT IN AN ORGANIZED HEALTH CARE EDUCATION/TRAINING PROGRAM

## 2025-02-17 RX ORDER — FLUTICASONE PROPIONATE 50 MCG
2 SPRAY, SUSPENSION (ML) NASAL DAILY
COMMUNITY
Start: 2025-01-24

## 2025-02-17 RX ORDER — MELOXICAM 15 MG/1
15 TABLET ORAL DAILY
Qty: 30 TABLET | Refills: 2 | Status: SHIPPED | OUTPATIENT
Start: 2025-02-17

## 2025-02-17 RX ORDER — CYCLOBENZAPRINE HCL 5 MG
5 TABLET ORAL NIGHTLY PRN
Qty: 30 TABLET | Refills: 2 | Status: SHIPPED | OUTPATIENT
Start: 2025-02-17

## 2025-02-17 NOTE — PROGRESS NOTES
Rheumatology New Outpatient  Note    Subjective   Di Dixon is a 49 y.o. female presenting today for Joint Pain and Dry skin.    History of Presenting Problem:   Di with PMHx of HLP, Sicca, vitamin D deficiency, is presenting today as a NP for sicca symptoms:    She has pain in her knees, hands, hips and all over her joints worse with movements. She has puffiness in her hands in the morning. She takes OTC NSAIDs occasionally which helps. She has knee OA and was offered replacement surgery which she declined. She failed PT and cortisone injections before without much help.     She has dry eye ,dry mouth, dry skin. She denies hair loss, malar rash, photosensitivity, skin rashes,recurrent mouth sores, sudden vision loss, sudden hearing loss, seizures, inflammatory eye disease, h/o blood clots, cold sensitivity in fingers, vasospastic color changes in fingers when exposed to extreme temperatures and muscle weakness.    She denies family history of autoimmune diseases.       Past Medical History:   Past Medical History:   Diagnosis Date    Keratoconjunctivitis sicca not due to Sjogren's syndrome     Pain in unspecified knee     Knee pain       Allergies: No Known Allergies    Medications:   Current Outpatient Medications:     fluticasone (Flonase) 50 mcg/actuation nasal spray, Administer 2 sprays into each nostril once daily., Disp: , Rfl:     peg 400-propylene glycol, PF, (Systane Hydration, PF,) 0.4-0.3 % dropperette, Administer 1 drop into both eyes 4 times a day., Disp: 60 each, Rfl: 6    Tyrvaya 0.03 mg/spray, USE 1 SPRAY IN EACH NOSTRIL TWICE DAILY, APPROXIMATELY 12 HOURS APART, Disp: 8.4 mL, Rfl: 3    Review of Systems:   Constitutional: Denies fever, chills   Eyes: Denies dry eyes, pain in the eyes   ENT: Denies dry mouth, loss of taste, sores in the mouth  Cardiovascular: Denies chest pain, palpitations   Respiratory: Denies shortness of breath   Gastrointestinal: Denies heartburn   Integumentary: Denies  "photosensitivity, rash or lesions, Raynaud's   Neurological: Denies any numbness or tingling    MSK: As per HPI.     All 10 review of systems have been reviewed and are negative for complaint except as noted in the HPI    Objective   Physical Examination:  There were no vitals filed for this visit.  Growth %ile SmartLinks can only be used for patients less than 20 years old.  Ht Readings from Last 1 Encounters:   25 1.676 m (5' 6\")     Wt Readings from Last 1 Encounters:   24 74.5 kg (164 lb 4 oz)       General - NAD, sitting up in chair, well-groomed, pleasant, AAOx3  Head: Normocephalic, atraumatic  Eyes - PERRLA, EOMI. No conjunctiva injection.   Mouth/ENT - Moist oral and nasal mucosa. No facial rash.   Cardiovascular - Normal S1, S2. Regular rate and rhythm. No murmurs or rubs.  Lungs - Symmetric chest expansion. Clear to auscultation bilaterally.   Skin - No rashes or ulcers. Skin warm and dry. No erythema on bilateral cheeks.  Extremities - No edema, cyanosis ,or clubbing  Neurological - Alert and oriented x 3,  grossly intact. No focal deficit.  Musculoskeletal -  Shoulders: Full ROM, without pain, no swelling, warmth or tenderness.  Elbows: Full ROM, without pain, no swelling, warmth or tenderness.  Wrists: Full ROM, without pain, no swelling, warmth or tenderness.  MCP: No swelling, warmth or tenderness. Metacarpal squeeze negative  PIP: No swelling, warmth or tenderness.  DIP: No swelling, warmth or tenderness.  Hands : 5/5.    Sacroiliac joints: No local tenderness. GREY negative.   Hips: Full ROM.  No malalignment.  Knees:  Full ROM, without pain, no swelling, warmth or point tenderness.   Ankles: Full ROM, without pain, swelling, warmth or tenderness.  Toes:  Metatarsal squeeze negative  Cervical spine: No tenderness or limitation of movement  Lumbar spine: No tenderness or limitation of movement        Laboratory Testin2025: RF-, uric acid normal, CRP/ESR normal, DEREK-, vitamin D " normal        Assessment/Plan   Di Dixon is a 49 y.o. female with PMHx of HLP, Sicca, vitamin D deficiency who is presenting today as a NP for sicca symptoms    ##Sicca symptoms & polyarthralgia but DEREK-, RF-: I will check SSA/SSB (could still be positive in negative DEREK) and CCP    ##Polyarticular OA: start Meloxicam    ##Chronic back pain with normal x-rays:  Start Flexeril PRN    The assessment and plan, risk and benefits were discussed with the patient. All of the patients questions were answered and patient agrees to the plan.      Rodney Rolle MD  Clinical   Department of Rheumatology   [facility]

## 2025-04-01 ENCOUNTER — APPOINTMENT (OUTPATIENT)
Facility: CLINIC | Age: 50
End: 2025-04-01
Payer: COMMERCIAL

## 2025-05-22 DIAGNOSIS — Z12.31 ENCOUNTER FOR SCREENING MAMMOGRAM FOR BREAST CANCER: ICD-10-CM

## 2025-05-26 DIAGNOSIS — H16.223 KERATOCONJUNCTIVITIS SICCA OF BOTH EYES NOT SPECIFIED AS SJOGREN'S: ICD-10-CM

## 2025-05-27 RX ORDER — VARENICLINE 0.03 MG/.05ML
SPRAY NASAL
Qty: 8.4 ML | Refills: 3 | Status: SHIPPED | OUTPATIENT
Start: 2025-05-27

## 2025-06-07 DIAGNOSIS — M15.9 POLYARTICULAR OSTEOARTHRITIS: ICD-10-CM

## 2025-06-11 RX ORDER — MELOXICAM 15 MG/1
15 TABLET ORAL DAILY
Qty: 30 TABLET | Refills: 2 | OUTPATIENT
Start: 2025-06-11

## 2025-08-12 ENCOUNTER — APPOINTMENT (OUTPATIENT)
Dept: PRIMARY CARE | Facility: CLINIC | Age: 50
End: 2025-08-12
Payer: COMMERCIAL

## 2025-08-12 VITALS
WEIGHT: 179 LBS | TEMPERATURE: 97.9 F | SYSTOLIC BLOOD PRESSURE: 106 MMHG | BODY MASS INDEX: 28.89 KG/M2 | DIASTOLIC BLOOD PRESSURE: 67 MMHG | HEART RATE: 79 BPM | OXYGEN SATURATION: 96 % | RESPIRATION RATE: 16 BRPM

## 2025-08-12 DIAGNOSIS — M25.512 ACUTE PAIN OF LEFT SHOULDER: Primary | ICD-10-CM

## 2025-08-12 DIAGNOSIS — M13.0 POLYARTICULAR ARTHRITIS: ICD-10-CM

## 2025-08-12 DIAGNOSIS — M67.922 TENDINOPATHY OF LEFT BICEPS TENDON: ICD-10-CM

## 2025-08-12 DIAGNOSIS — H16.223 KERATOCONJUNCTIVITIS SICCA OF BOTH EYES: ICD-10-CM

## 2025-08-12 PROCEDURE — 99214 OFFICE O/P EST MOD 30 MIN: CPT

## 2025-08-12 RX ORDER — METHOCARBAMOL 500 MG/1
500 TABLET, FILM COATED ORAL EVERY 8 HOURS PRN
COMMUNITY
Start: 2025-08-08 | End: 2025-08-12

## 2025-08-12 RX ORDER — TIZANIDINE 4 MG/1
4 TABLET ORAL EVERY 6 HOURS PRN
Qty: 30 TABLET | Refills: 0 | Status: SHIPPED | OUTPATIENT
Start: 2025-08-12 | End: 2025-08-22

## 2025-08-12 RX ORDER — DICLOFENAC SODIUM 75 MG/1
75 TABLET, DELAYED RELEASE ORAL 2 TIMES DAILY PRN
Qty: 180 TABLET | Refills: 3 | Status: SHIPPED | OUTPATIENT
Start: 2025-08-12 | End: 2026-08-12

## 2025-08-12 RX ORDER — LIFITEGRAST 50 MG/ML
SOLUTION/ DROPS OPHTHALMIC
COMMUNITY
Start: 2025-07-24

## 2025-08-21 ENCOUNTER — HOSPITAL ENCOUNTER (OUTPATIENT)
Dept: RADIOLOGY | Facility: CLINIC | Age: 50
Discharge: HOME | End: 2025-08-21
Payer: COMMERCIAL

## 2025-08-21 ENCOUNTER — APPOINTMENT (OUTPATIENT)
Dept: ORTHOPEDIC SURGERY | Facility: CLINIC | Age: 50
End: 2025-08-21
Payer: COMMERCIAL

## 2025-08-21 DIAGNOSIS — M25.512 LEFT SHOULDER PAIN, UNSPECIFIED CHRONICITY: ICD-10-CM

## 2025-08-21 DIAGNOSIS — M75.102 TEAR OF LEFT ROTATOR CUFF, UNSPECIFIED TEAR EXTENT, UNSPECIFIED WHETHER TRAUMATIC: ICD-10-CM

## 2025-08-21 PROCEDURE — 73030 X-RAY EXAM OF SHOULDER: CPT | Mod: LEFT SIDE | Performed by: RADIOLOGY

## 2025-08-21 PROCEDURE — 99212 OFFICE O/P EST SF 10 MIN: CPT | Performed by: ORTHOPAEDIC SURGERY

## 2025-08-21 PROCEDURE — 73030 X-RAY EXAM OF SHOULDER: CPT | Mod: LT

## 2025-09-02 ENCOUNTER — HOSPITAL ENCOUNTER (OUTPATIENT)
Dept: RADIOLOGY | Facility: CLINIC | Age: 50
Discharge: HOME | End: 2025-09-02
Payer: COMMERCIAL

## 2025-09-02 DIAGNOSIS — M75.102 TEAR OF LEFT ROTATOR CUFF, UNSPECIFIED TEAR EXTENT, UNSPECIFIED WHETHER TRAUMATIC: ICD-10-CM

## 2025-09-02 PROCEDURE — 73221 MRI JOINT UPR EXTREM W/O DYE: CPT | Mod: LEFT SIDE | Performed by: RADIOLOGY

## 2025-09-02 PROCEDURE — 73221 MRI JOINT UPR EXTREM W/O DYE: CPT | Mod: LT

## 2025-10-07 ENCOUNTER — APPOINTMENT (OUTPATIENT)
Dept: RHEUMATOLOGY | Facility: CLINIC | Age: 50
End: 2025-10-07
Payer: COMMERCIAL